# Patient Record
Sex: MALE | RURAL
[De-identification: names, ages, dates, MRNs, and addresses within clinical notes are randomized per-mention and may not be internally consistent; named-entity substitution may affect disease eponyms.]

---

## 2017-12-14 ENCOUNTER — APPOINTMENT (OUTPATIENT)
Age: 47
Setting detail: DERMATOLOGY
End: 2017-12-14

## 2017-12-14 DIAGNOSIS — L28.1 PRURIGO NODULARIS: ICD-10-CM

## 2017-12-14 PROCEDURE — 99213 OFFICE O/P EST LOW 20 MIN: CPT

## 2017-12-14 PROCEDURE — OTHER COUNSELING: OTHER

## 2017-12-14 PROCEDURE — OTHER PRESCRIPTION: OTHER

## 2017-12-14 RX ORDER — MOMETASONE FUROATE 1 MG/G
PEA SIZED AMOUNT OINTMENT TOPICAL
Qty: 1 | Refills: 1 | COMMUNITY
Start: 2017-12-14

## 2017-12-14 NOTE — PROCEDURE: COUNSELING
Detail Level: Detailed
Patient Specific Counseling (Will Not Stick From Patient To Patient): Advised to avoid digging at sites with pocket knife as this also can increase risk for infection as well impair healing. \\nAdvised smoking cessation to improve healing and overall health status.

## 2017-12-14 NOTE — HPI: SKIN LESIONS
Have Your Skin Lesions Been Treated?: been treated
Is This A New Presentation, Or A Follow-Up?: Skin Lesions
Additional History: Pt admits to picking and squeezing at site.Has been prescibed doxycyline 100mg bid and triamcinlone o.1 cream previously for PN.\\n